# Patient Record
Sex: FEMALE | Race: WHITE | NOT HISPANIC OR LATINO | Employment: PART TIME | URBAN - NONMETROPOLITAN AREA
[De-identification: names, ages, dates, MRNs, and addresses within clinical notes are randomized per-mention and may not be internally consistent; named-entity substitution may affect disease eponyms.]

---

## 2024-02-15 ENCOUNTER — OFFICE VISIT (OUTPATIENT)
Dept: PSYCHIATRY | Facility: CLINIC | Age: 59
End: 2024-02-15
Payer: MEDICAID

## 2024-02-15 DIAGNOSIS — F33.2 SEVERE EPISODE OF RECURRENT MAJOR DEPRESSIVE DISORDER, WITHOUT PSYCHOTIC FEATURES: Primary | ICD-10-CM

## 2024-02-15 DIAGNOSIS — G89.4 CHRONIC PAIN SYNDROME: ICD-10-CM

## 2024-02-15 DIAGNOSIS — Z65.8 INADEQUATE SOCIAL SUPPORT: ICD-10-CM

## 2024-02-15 DIAGNOSIS — F41.9 SEVERE ANXIETY: ICD-10-CM

## 2024-02-15 PROCEDURE — 90791 PSYCH DIAGNOSTIC EVALUATION: CPT | Performed by: COUNSELOR

## 2024-02-15 PROCEDURE — 1159F MED LIST DOCD IN RCRD: CPT | Performed by: COUNSELOR

## 2024-02-15 PROCEDURE — 1160F RVW MEDS BY RX/DR IN RCRD: CPT | Performed by: COUNSELOR

## 2024-02-20 NOTE — PROGRESS NOTES
"Subjective   Apryl Kan is a 58 y.o. female who is here today 2/15/2024 for initial behavioral health evaluation starting at 9:00 AM and ending at 10:00 AM.    Chief Complaint:    Patient rated both her anxiety and depression at 9 with 10 being the most severe.  Patient denies intent or attempt but has experienced suicidal ideation, \"I would be better off not here.\"    History of Present Illness:  \"We moved from [place] to [place] close to [place] where dad's family was from in 1979 and I was 13 years old.  It was a culture shock.  The kids made fun of me.\"  \"He was never a mean alcoholic.  He loved me as best he could.  I felt he wanted a boy.  I was constantly on edge with shy.  The only time he told me he was proud of me was when he was dying.  In the past I felt like we were 6/10 close, but now I see it was really only 5/10,with 10 being the closest connection.  I see now that he always had to be in control.  He would holler, 'You can do better than that.'   I never felt good enough for shy to love me.  I hid in my closet not to hear my parents bickering.  When I am yelled that I take it, I do not need to project my feelings.\"    \"In my marriage to Akbar, I was not enough.  My daddy got him a job at .  I was not allowed to work.  He would rather have me take care of him and have dinner on the table.  We could not have children.  It was an emotional roller coaster.  He was not physical.  I looked the other way when he was cheating.  After 3 days of me not initiating speaking and he did not speak I confronted him and he had not even noticed we had not been talking.  He was the song leader and deacon at [employer].  My mother did not even know, but my father knew Akbar was cheating when I asked to move home.  I was so beat down, I felt worthless.\"    Patient earned a high school diploma.  \"I started working the first time at a [employer] place right out of school.    After the divorce I worked as a  " "for a hotel for 7 years.  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  I work from 3 to 7 PM 5 days a week.\"    \"My  Ignacio has bipolar disorder.  It is like I am living with Avis and Gene in the same person.  He had a good job in [place].  1 day he got mad at the  and yelled at her and got fired when he would not apologize.  We had had a new home and 2 cars.  He got a job with half the pay and quit the job 2 years ago so now we live on my income and were down to 1 car.  I am resentful.\"    \"I love Ignacio and I keep hoping it would get better.\"    \"2 weeks ago I had a meltdown at work and had to quit because I was crying so hard.  I had to horrible calls back to back and did not know how to detach.  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  I felt so sorry for the little old lady who had no electricity on top of what I deal with.  I kept saying, 'I don't want to die!'  I am overwhelmed with my work and home situation.  I also have physical pain, rheumatoid arthritis and fibromyalgia and autoimmune arthritis.  Many days are especially bad and makes my depression worse.  I never have been lazy.\"    Past Psych History:  Patient has had no previous therapy or psychiatric hospitalization.    Substance Abuse:  Patient reports drinking only 2-3 wine coolers in her life.  She denies the use of tobacco, marijuana or any other illegal substance.  \"I never wanted to be out of control.\"    Social History:   Patient's father, passed away in  at age 68, due to pancreatic cancer.  \"He  9 days after he was diagnosed.  The last 4 days he did not recognize me.\"  \"He lived in [place] and worked at the [employer] making transmissions.  He was born in a family of 14 kids, they were sharecroppers.\"    Patient reported her " "mother  at 69 years old after spending 5 weeks in the hospital with a gallbladder condition and got staph pneumonia. She had unconditional love for who I was.  My dad and brother did not want to be the ones to decide when to turn off the respirator and I had to be the 1.  It was really hard.  My mom was my best friend.  She was sassy/rude.  I wished I had be more like her.  She had to defend herself with dad and she defended us to him.\"  Patient rated her relationship with her mother at 10/10.    Patient had 1 brother, Morgan, age 56.  \"We were buddies as kids.  We still love each other, but we are not as close.  Patient rated their relationship as children at 8/10 with 10 being the closest connection.  \"Now it is 5/10.  He still lives in [place].  He  my best friend from high school but she turned out not to be a nice person and he turned to alcohol.\"    Patient  Akbar when she was 19 and he was 20 years old.  They were  for 13 years and  in .\"      \"I was single for 12 years until I met Ignacio, age 59.  After mom  dad got up with his high school [employer], I decided to move to be with my niece, Chiqui.  She put me on a dating site without telling me.  That is how I met Ignacio.  I have been with Ignacio for 13 years.  He has 4 kids, ages 18, 17, 8 and 7 years old.  He had full custody.  His ex lost her job and moved out of state.  He fought for the kids and got custody.  He was raising the 4 kids by himself by the time I came along.  \"The adjustment was hard, they were totally different than me.  I am huggy and feely, but when I tried to comfort the little ones and 'mother them' they were not comforted.  Now they love me and appreciate me.  I tried not to disrupt their lives.  They were not receptive to my mothering.  They got closer to their mother in later years.  She cheated on Ignacio and he even raised her child that was not his.\"      Visit Diagnoses:    ICD-10-CM ICD-9-CM "   1. Severe episode of recurrent major depressive disorder, without psychotic features  F33.2 296.33   2. Severe anxiety  F41.9 300.00   3. Inadequate social support  Z65.8 V62.89   4. Chronic pain syndrome  G89.4 338.4         Family Psychiatric History:  family history is not on file.    Medical/Surgical History:  Past Medical History:   Diagnosis Date    Anxiety     Depression      History reviewed. No pertinent surgical history.    Not on File        Current Medications:   No current outpatient medications on file.     No current facility-administered medications for this visit.         Objective   There were no vitals taken for this visit.    Mental Status Exam:   Hygiene:   good  Cooperation:  Cooperative  Eye Contact:  Good  Psychomotor Behavior:  Appropriate  Affect:  Appropriate  Hopelessness: 9  Speech:  Normal  Thought Process:  Goal directed and Linear  Thought Content:  Normal  Suicidal:  None  Homicidal:  None  Hallucinations:  None  Delusion:  None  Memory:  Intact  Orientation:  Person, Place, Time, and Situation  Reliability:  good  Insight:  Good  Judgement:  Good  Impulse Control:  Good  Physical/Medical Issues:  Yes rheumatoid arthritis, fibromyalgia and autoimmune arthritis.      DIAGNOSTIC IMPRESSION:   Encounter Diagnoses   Name Primary?    Severe episode of recurrent major depressive disorder, without psychotic features Yes    Severe anxiety     Inadequate social support     Chronic pain syndrome        PROBLEM LIST:   Patient is suffering severe anxiety and depression.  She lacks adequate social support, is resentful about her  not working, suffers chronic pain.    STRENGTHS:   Patient appears motivated for treatment is currently engaged and compliant.    WEAKNESSES:  Ineffective coping skills, disease management      SHORT-TERM GOALS: Patient will be compliant with clinic appointments.  Patient will be engaged in therapy, medication compliant with minimal side effects. Patient  will  report decreased frequency and severity of symptoms.     LONG-TERM GOALS: Patient will have minimal symptoms of  with continued medication management. Patient will be compliant with treatment and appointments.       PLAN:   Patient will continue with individual outpatient treatment and pharmacotherapy as scheduled.     Return in about 2 weeks (around 2/29/2024).     The patient was instructed to call clinic as needed or go to ER if in crisis.          This document electronically signed by Beryl Garza, RICKYC, NCC, CSAT    Beryl Garza  Licensed Professional Clinical Counselor  Certified Sexual Addiction Therapist

## 2024-02-29 ENCOUNTER — TELEMEDICINE (OUTPATIENT)
Dept: PSYCHIATRY | Facility: CLINIC | Age: 59
End: 2024-02-29
Payer: MEDICAID

## 2024-02-29 DIAGNOSIS — F41.9 MODERATE ANXIETY: ICD-10-CM

## 2024-02-29 DIAGNOSIS — Z65.8 INADEQUATE SOCIAL SUPPORT: ICD-10-CM

## 2024-02-29 DIAGNOSIS — F33.1 MAJOR DEPRESSIVE DISORDER, RECURRENT EPISODE, MODERATE: Primary | ICD-10-CM

## 2024-02-29 PROCEDURE — 1160F RVW MEDS BY RX/DR IN RCRD: CPT | Performed by: COUNSELOR

## 2024-02-29 PROCEDURE — 90837 PSYTX W PT 60 MINUTES: CPT | Performed by: COUNSELOR

## 2024-02-29 PROCEDURE — 1159F MED LIST DOCD IN RCRD: CPT | Performed by: COUNSELOR

## 2024-03-08 NOTE — PROGRESS NOTES
"    PROGRESS NOTE  Data:  Apryl Kan came in 2/29/2024 for her regularly scheduled therapy session starting at 11 AM and ending at 12 PM, with Beryl Garza [facility].    The providers located the [facility].  Patient is located in her home.  Clinic visit is electronic, but could not doctor by [facility].  The patient gave consent to be seen remotely, and when consent was given understood that the consent allows for patient identifiable information to be sent to a third party as needed.  Patient may refuse to be seen remotely at any time.  The left [facility] data is encrypted and password-protected, and the patient has been advised of the potential risk to privacy not withstanding such measures.     (Scales based on 0 - 10 with 10 being the worst)  Depression: 6 Anxiety: 6     HPI:   \"I have improved since last time, my depression was 9/10 and my anxiety was 10/10.\"  \"What helped was talking to you and I kept asking myself, 'is this fair?'\"    \"I got my 2023 performance bonus.  I told myself,'I am not completely broke.  I opened the checking account and my name and have a safety net, it will be minimal but is mine.  My  is okay with it.\"      \"He started door dashing also and that is helping.  It gets him out of the house.  I think my breakdown motivated him to think, 'If she breaks down, what will we do?'  It helped.  He has found that the best time is between 11 AM and 3 PM.  He cannot see after dark and he found that breakfast was not worth it.\"  \"I tell them I am proud of him and appreciated but I have always done it.\"    \"I appreciate having balance.\"  \"I got my [employer] music back.  It cost $10 a month and I had tried to cut back but it makes me happy to have music without interruptions.\"    \"I had built up my walls but I am trying to tear them down little by little.\"    Patient rated her relationship with her  when they were first  at 10/10 and today only 7/10.\"    Patient shows her love " "languages are:  Words of affirmation, acts of service, focused attention/quality time and gifts.  Patient believes that her  shares the same love languages and she enjoys physical touch more than he does.  \"He loves and keeps things I have made for him.  He loves my focused attention.  Sometimes he feels worthless and it triggers his grief.\"    Clinical Maneuvering/Intervention:  Assisted patient in processing above session content; acknowledged and normalized patient’s thoughts, feelings, and concerns.     Shared the concept of the 5 love languages.  Discussed equal power yet different roles in the marriage relationship.    Allowed patient to freely discuss issues without interruption or judgment. Provided safe, confidential environment to facilitate the development of positive therapeutic relationship and encourage open, honest communication. Assisted patient in identifying risk factors which would indicate the need for higher level of care including thoughts to harm self or others and/or self-harming behavior and encouraged patient to contact this office, call 911, or present to the nearest emergency room should any of these events occur. Discussed crisis intervention services and means to access.  Patient adamantly and convincingly denies current suicidal or homicidal ideation or perceptual disturbance.        Assessment     Patient is stable, positive and progressing.    Diagnosis:   Encounter Diagnoses   Name Primary?    Major depressive disorder, recurrent episode, moderate Yes    Moderate anxiety     Inadequate social support        Major depressive disorder, recurrent episode, moderate [F33.1]    Mental Status Exam  Hygiene:  good  Dress:  casual  Attitude:  Cooperative  Motor Activity:  Appropriate  Speech:  Normal  Mood:  within normal limits  Affect:  calm and pleasant  Thought Processes:  Goal directed and Linear  Thought Content:  normal  Suicidal Thoughts:  does not  Homicidal Thoughts:  does " not  Crisis Safety Plan: yes, to come to the emergency room.  Hallucinations:  does not          Patient's Support Network Includes:   and extended family    Progress toward goal: Not at goal    Functional Status: Mild impairment     Prognosis: Good with Ongoing Treatment       Plan         Patient will adhere to medication regimen as prescribed and report any side effects. Patient will contact this office, call 911 or present to the nearest emergency room should suicidal or homicidal ideations occur. Provide Cognitive Behavioral Therapy and Integrative Therapy to improve functioning, maintain stability, and avoid decompensation and the need for higher level of care.            Return in about 3 weeks (around 3/21/2024).            This document electronically signed by Beryl Garza, MAIKEL, NCC, CSAT  March 8, 2024 15:50 EST    Plan